# Patient Record
Sex: MALE
[De-identification: names, ages, dates, MRNs, and addresses within clinical notes are randomized per-mention and may not be internally consistent; named-entity substitution may affect disease eponyms.]

---

## 2017-12-01 ENCOUNTER — APPOINTMENT (OUTPATIENT)
Dept: CARDIOLOGY | Facility: CLINIC | Age: 82
End: 2017-12-01

## 2018-11-06 ENCOUNTER — APPOINTMENT (OUTPATIENT)
Dept: CARDIOLOGY | Facility: CLINIC | Age: 83
End: 2018-11-06
Payer: MEDICARE

## 2018-11-06 ENCOUNTER — NON-APPOINTMENT (OUTPATIENT)
Age: 83
End: 2018-11-06

## 2018-11-06 VITALS — WEIGHT: 144 LBS | DIASTOLIC BLOOD PRESSURE: 88 MMHG | SYSTOLIC BLOOD PRESSURE: 180 MMHG | HEART RATE: 63 BPM

## 2018-11-06 DIAGNOSIS — Z85.46 PERSONAL HISTORY OF MALIGNANT NEOPLASM OF PROSTATE: ICD-10-CM

## 2018-11-06 DIAGNOSIS — Z80.3 FAMILY HISTORY OF MALIGNANT NEOPLASM OF BREAST: ICD-10-CM

## 2018-11-06 DIAGNOSIS — R63.4 ABNORMAL WEIGHT LOSS: ICD-10-CM

## 2018-11-06 DIAGNOSIS — R53.83 OTHER FATIGUE: ICD-10-CM

## 2018-11-06 DIAGNOSIS — Z87.891 PERSONAL HISTORY OF NICOTINE DEPENDENCE: ICD-10-CM

## 2018-11-06 DIAGNOSIS — Z80.0 FAMILY HISTORY OF MALIGNANT NEOPLASM OF DIGESTIVE ORGANS: ICD-10-CM

## 2018-11-06 DIAGNOSIS — Z86.79 PERSONAL HISTORY OF OTHER DISEASES OF THE CIRCULATORY SYSTEM: ICD-10-CM

## 2018-11-06 DIAGNOSIS — Z87.39 PERSONAL HISTORY OF OTHER DISEASES OF THE MUSCULOSKELETAL SYSTEM AND CONNECTIVE TISSUE: ICD-10-CM

## 2018-11-06 PROCEDURE — 99204 OFFICE O/P NEW MOD 45 MIN: CPT

## 2018-11-06 PROCEDURE — 93000 ELECTROCARDIOGRAM COMPLETE: CPT

## 2018-11-06 NOTE — REASON FOR VISIT
[Consultation] : a consultation regarding [Aortic Stenosis] : aortic stenosis [Hypertension] : hypertension

## 2018-11-07 NOTE — PHYSICAL EXAM
[General Appearance - Well Developed] : well developed [Normal Appearance] : normal appearance [Well Groomed] : well groomed [General Appearance - Well Nourished] : well nourished [No Deformities] : no deformities [General Appearance - In No Acute Distress] : no acute distress [Normal Conjunctiva] : the conjunctiva exhibited no abnormalities [Eyelids - No Xanthelasma] : the eyelids demonstrated no xanthelasmas [Normal Oral Mucosa] : normal oral mucosa [No Oral Pallor] : no oral pallor [No Oral Cyanosis] : no oral cyanosis [Normal Jugular Venous A Waves Present] : normal jugular venous A waves present [Normal Jugular Venous V Waves Present] : normal jugular venous V waves present [No Jugular Venous Clements A Waves] : no jugular venous clements A waves [Heart Rate And Rhythm] : heart rate and rhythm were normal [Respiration, Rhythm And Depth] : normal respiratory rhythm and effort [Exaggerated Use Of Accessory Muscles For Inspiration] : no accessory muscle use [Auscultation Breath Sounds / Voice Sounds] : lungs were clear to auscultation bilaterally [Abdomen Soft] : soft [Abdomen Tenderness] : non-tender [Abdomen Mass (___ Cm)] : no abdominal mass palpated [Abnormal Walk] : normal gait [Gait - Sufficient For Exercise Testing] : the gait was sufficient for exercise testing [Nail Clubbing] : no clubbing of the fingernails [Cyanosis, Localized] : no localized cyanosis [Petechial Hemorrhages (___cm)] : no petechial hemorrhages [Skin Color & Pigmentation] : normal skin color and pigmentation [] : no rash [No Venous Stasis] : no venous stasis [Skin Lesions] : no skin lesions [No Skin Ulcers] : no skin ulcer [No Xanthoma] : no  xanthoma was observed [Oriented To Time, Place, And Person] : oriented to person, place, and time [Affect] : the affect was normal [Mood] : the mood was normal [No Anxiety] : not feeling anxious [FreeTextEntry1] : [place] [employer] [employer]

## 2018-11-07 NOTE — REVIEW OF SYSTEMS
[Feeling Fatigued] : feeling fatigued [Recent Weight Loss (___ Lbs)] : recent [unfilled] ~Ulb weight loss [see HPI] : see HPI [Memory Lapses Or Loss] : memory lapses or loss [Negative] : Heme/Lymph [Under Stress] : not under stress

## 2018-11-07 NOTE — HISTORY OF PRESENT ILLNESS
[FreeTextEntry1] : MAGDA ZENG  is a 83 year old  M\par History of prostate cancer, cognitive dysfunction\par He previously was told of hypertension and was on blood pressure medications transiently.\par Recently, had an office visit with primary physician for fatigue and weight loss. \par Noted to have murmur and referred for further evaluation. \par He previously was told of heart murmur but has not had any cardiovascular testing.\par \par There is no prior history of a clinical myocardial infarction, coronary revascularization. \par There is no history of symptomatic congestive heart failure rheumatic heart disease.\par There is no history of symptomatic arrhythmias including atrial fibrillation.\par \par Less active due to arthritis. Does home exercises\par There is no exertional chest pain, pressure or discomfort. \par There is no significant dyspnea on exertion or orthopnea. \par There are no symptomatic palpitations, lightheadedness, dizziness or syncope.\par \par EKG demonstrates normal sinus rhythm with right ventricular conduction delay. \par \[place]par \par October 2018. Hemoglobin 11.6, elevated LDH. Hematology evaluation is pending.\par Potassium 5.2, creatinine 0.9, total cholesterol 181, , TSH 1.2. \par \par Primary care referral has been reviewed.

## 2018-11-07 NOTE — ASSESSMENT
[FreeTextEntry1] : Heart murmur. \par Suspect aortic stenosis. \par Echocardiogram. \par Rule out carotid stenosis. \par Further cardiovascular eval after completion of above. \par Requested outside hematology evaluation. \par All questions have been answered. \par Blood work has been reviewed.\par

## 2018-11-14 ENCOUNTER — APPOINTMENT (OUTPATIENT)
Dept: CARDIOLOGY | Facility: CLINIC | Age: 83
End: 2018-11-14
Payer: MEDICARE

## 2018-11-14 PROCEDURE — 93306 TTE W/DOPPLER COMPLETE: CPT

## 2018-11-14 PROCEDURE — 93880 EXTRACRANIAL BILAT STUDY: CPT

## 2018-11-14 PROCEDURE — 93979 VASCULAR STUDY: CPT

## 2018-11-27 ENCOUNTER — APPOINTMENT (OUTPATIENT)
Dept: CARDIOLOGY | Facility: CLINIC | Age: 83
End: 2018-11-27
Payer: MEDICARE

## 2018-11-27 VITALS
DIASTOLIC BLOOD PRESSURE: 62 MMHG | HEIGHT: 65 IN | HEART RATE: 60 BPM | SYSTOLIC BLOOD PRESSURE: 152 MMHG | BODY MASS INDEX: 23.99 KG/M2 | WEIGHT: 144 LBS

## 2018-11-27 PROCEDURE — 99214 OFFICE O/P EST MOD 30 MIN: CPT

## 2018-12-04 ENCOUNTER — MEDICATION RENEWAL (OUTPATIENT)
Age: 83
End: 2018-12-04

## 2018-12-04 RX ORDER — CARVEDILOL 6.25 MG/1
6.25 TABLET, FILM COATED ORAL TWICE DAILY
Qty: 60 | Refills: 2 | Status: ACTIVE | COMMUNITY
Start: 1900-01-01 | End: 1900-01-01

## 2018-12-05 ENCOUNTER — APPOINTMENT (OUTPATIENT)
Dept: CARDIOLOGY | Facility: CLINIC | Age: 83
End: 2018-12-05
Payer: MEDICARE

## 2018-12-05 DIAGNOSIS — Z78.9 OTHER SPECIFIED HEALTH STATUS: ICD-10-CM

## 2018-12-05 DIAGNOSIS — Z83.3 FAMILY HISTORY OF DIABETES MELLITUS: ICD-10-CM

## 2018-12-05 DIAGNOSIS — R55 SYNCOPE AND COLLAPSE: ICD-10-CM

## 2018-12-05 PROCEDURE — 93015 CV STRESS TEST SUPVJ I&R: CPT

## 2018-12-05 PROCEDURE — A9502: CPT

## 2018-12-05 PROCEDURE — 78452 HT MUSCLE IMAGE SPECT MULT: CPT

## 2018-12-05 RX ORDER — ASPIRIN 81 MG
81 TABLET, DELAYED RELEASE (ENTERIC COATED) ORAL DAILY
Refills: 0 | Status: ACTIVE | COMMUNITY

## 2018-12-05 NOTE — ADDENDUM
[FreeTextEntry1] : MAGDA ZENG  is a 83 year M  who presents today Dec 05, 2018 for nuclear stress test. Stress test with no evidence of ischemia or infarct. \par \par Preoperative status for upcoming aneurysm repair \par date December 7, 2018 \par At present, there are no active cardiac conditions. \par No recent unstable coronary syndromes, decompensated heart failure, severe valvular heart disease or significant dysrhythmias.  \par Baseline functional status is limited.    \par The clinical benefit of the proposed procedure outweighs the associated cardiovascular risk.  \par Risk not attenuated with further CV testing.  \par Prior testing as outlined above.\par Optimized from a cardiovascular perspective.\par Minimize time off ASA\par Continue beta blocker\par DVT ppx\par \par Radha Lacey PA-C\par Supervising MD: Teto Mancini

## 2018-12-05 NOTE — PHYSICAL EXAM
[General Appearance - Well Developed] : well developed [Normal Appearance] : normal appearance [Well Groomed] : well groomed [General Appearance - Well Nourished] : well nourished [No Deformities] : no deformities [General Appearance - In No Acute Distress] : no acute distress [Normal Conjunctiva] : the conjunctiva exhibited no abnormalities [Eyelids - No Xanthelasma] : the eyelids demonstrated no xanthelasmas [Normal Oral Mucosa] : normal oral mucosa [No Oral Pallor] : no oral pallor [No Oral Cyanosis] : no oral cyanosis [Normal Jugular Venous A Waves Present] : normal jugular venous A waves present [Normal Jugular Venous V Waves Present] : normal jugular venous V waves present [No Jugular Venous Clements A Waves] : no jugular venous clements A waves [Respiration, Rhythm And Depth] : normal respiratory rhythm and effort [Exaggerated Use Of Accessory Muscles For Inspiration] : no accessory muscle use [Auscultation Breath Sounds / Voice Sounds] : lungs were clear to auscultation bilaterally [Heart Rate And Rhythm] : heart rate and rhythm were normal [Abdomen Soft] : soft [Abdomen Tenderness] : non-tender [Abdomen Mass (___ Cm)] : no abdominal mass palpated [Abnormal Walk] : normal gait [Gait - Sufficient For Exercise Testing] : the gait was sufficient for exercise testing [Nail Clubbing] : no clubbing of the fingernails [Cyanosis, Localized] : no localized cyanosis [Petechial Hemorrhages (___cm)] : no petechial hemorrhages [Skin Color & Pigmentation] : normal skin color and pigmentation [] : no rash [No Venous Stasis] : no venous stasis [Skin Lesions] : no skin lesions [No Skin Ulcers] : no skin ulcer [No Xanthoma] : no  xanthoma was observed [Oriented To Time, Place, And Person] : oriented to person, place, and time [Affect] : the affect was normal [Mood] : the mood was normal [No Anxiety] : not feeling anxious [FreeTextEntry1] : [place]

## 2018-12-05 NOTE — ASSESSMENT
[FreeTextEntry1] : MAGDA ZENG  is a 83 year M  who presents today Nov 27, 2018 in clinical follow-up with the above history and the following active issues. \par \par Abdominal aortic aneurysm.  Scheduled for surgical intervention on December 7.  Preoperative evaluation at the hospital scheduled for tomorrow.  Recommend ischemic evaluation with pharmacological nuclear stress test.  Stress testing can be reviewed over the phone with an addendum to today's note.\par Blood pressure elevated at today's visit.  Recommend increasing carvedilol to 6.25 mg b.i.d. and continuing with Lisinopril. Low sodium diet reviewed. \par Lifestyle and risk factor modification. \par \par Moderate aortic stenosis. Continue to follow. Heart murmur noted on exam. \par Non-obstructive carotid stenosis. \par Recommend fasting lipid panel. Likely patient will benefit from statin therapy.\par Lifestyle and risk factor modification.\par \par Requested outside hematology evaluation. \par All questions have been answered. \par \par Red flag symptoms which would warrant sooner emergent evaluation reviewed with the patient. \par Questions and concerns were addressed and answered. \par \par Sincerely,\par \par Radha Lacey PA-C\par Patients history, testing and plan reviewed with supervising MD: Dr. Teto Mancini

## 2018-12-05 NOTE — REVIEW OF SYSTEMS
[Recent Weight Loss (___ Lbs)] : recent [unfilled] ~Ulb weight loss [see HPI] : see HPI [Memory Lapses Or Loss] : memory lapses or loss [Negative] : Heme/Lymph [Feeling Fatigued] : not feeling fatigued [Under Stress] : not under stress

## 2018-12-05 NOTE — HISTORY OF PRESENT ILLNESS
[FreeTextEntry1] : MAGDA ZENG  is a 83 year old  male presents today in clinical follow-up. Last seen in our office on November 6, 2018 with Dr. Manciin. Echocardiogram, carotid duplex and abdominal aortic ultrasound recommended.  The testing has been performed and he presents today to review the results.  Abdominal ultrasound demonstrated abdominal aortic aneurysm. Patient sent to ER for evaluation. Patient had discussion with Dr. Crowder regarding plan. Started on Carvedilol 3.125 mg b.i.d. and Lisinopril 20 mg q.d. for blood pressure control.  He is now scheduled for preoperative clearance at the hospital tomorrow and surgical intervention for aneurysm repair is scheduled for December 7, 2018 at [facility].\par Today he offers no new complaints.  Tolerating medication well regimen well.\par \par Today he denies chest pain, pressure, unusual shortness of breath, lightheadedness, dizziness, near syncope or syncope. \par \par History of prostate cancer, cognitive dysfunction\par He previously was told of hypertension and was on blood pressure medications transiently.\par \par There is no prior history of a clinical myocardial infarction, coronary revascularization. \par There is no history of symptomatic congestive heart failure rheumatic heart disease.\par There is no history of symptomatic arrhythmias including atrial fibrillation.\par \par Less active due to arthritis. Does home exercises\par There is no exertional chest pain, pressure or discomfort. \par There is no significant dyspnea on exertion or orthopnea. \par There are no symptomatic palpitations, lightheadedness, dizziness or syncope.\par \par EKG demonstrates normal sinus rhythm with right ventricular conduction delay. \par \[place] .\par \par Carotid duplex 11/2018 moderate plaque in the proximal ICA without obstruction\par \par Echo 11/2018 EF 58%, min MR, moderate AS\par \par Abd US 11/2018 possible intimal tear at the mid abd aorta at the infrarenal region extending into the distal abdominal aorta. Distal abdominal aorta aneurysm\par \par \par October 2018. Hemoglobin 11.6, elevated LDH. Hematology evaluation is pending.\par Potassium 5.2, creatinine 0.9, total cholesterol 181, , TSH 1.2. \par \par

## 2019-01-22 ENCOUNTER — APPOINTMENT (OUTPATIENT)
Dept: CARDIOLOGY | Facility: CLINIC | Age: 84
End: 2019-01-22

## 2019-10-29 ENCOUNTER — APPOINTMENT (OUTPATIENT)
Dept: CARDIOLOGY | Facility: CLINIC | Age: 84
End: 2019-10-29
Payer: MEDICARE

## 2019-10-29 VITALS
WEIGHT: 142 LBS | DIASTOLIC BLOOD PRESSURE: 60 MMHG | HEART RATE: 70 BPM | HEIGHT: 65 IN | BODY MASS INDEX: 23.66 KG/M2 | SYSTOLIC BLOOD PRESSURE: 156 MMHG | OXYGEN SATURATION: 97 %

## 2019-10-29 DIAGNOSIS — I65.29 OCCLUSION AND STENOSIS OF UNSPECIFIED CAROTID ARTERY: ICD-10-CM

## 2019-10-29 PROCEDURE — 99214 OFFICE O/P EST MOD 30 MIN: CPT

## 2019-10-29 NOTE — HISTORY OF PRESENT ILLNESS
[FreeTextEntry1] : MAGDA ZENG  is a 83 year old  male presents today in clinical follow-up. . Echocardiogram, carotid duplex and abdominal aortic ultrasound recommended.  The testing has been performed and he presents today to review the results.  Abdominal ultrasound demonstrated abdominal aortic aneurysm. Patient sent to ER for evaluation. Patient had discussion with Dr. Crowder regarding plan. Started on Carvedilol 3.125 mg b.i.d. and Lisinopril 20 mg q.d. for blood pressure control.   SP aneurysm repair.\par Today he offers no new complaints.  Tolerating medication well regimen well.\par \par Today he denies chest pain, pressure, unusual shortness of breath, lightheadedness, dizziness, near syncope or syncope. \par \par History of prostate cancer, cognitive dysfunction\par He previously was told of hypertension and was on blood pressure medications transiently.\par \par There is no prior history of a clinical myocardial infarction, coronary revascularization. \par There is no history of symptomatic congestive heart failure rheumatic heart disease.\par There is no history of symptomatic arrhythmias including atrial fibrillation.\par \par Less active due to arthritis. Does home exercises\par There is no exertional chest pain, pressure or discomfort. \par There is no significant dyspnea on exertion or orthopnea. \par There are no symptomatic palpitations, lightheadedness, dizziness or syncope.\par \par EKG demonstrates normal sinus rhythm with right ventricular conduction delay. \par \[place] .\par \par Carotid duplex 11/2018 moderate plaque in the proximal ICA without obstruction\par \par Echo 11/2018 EF 58%, min MR, moderate AS\par \par Abd US 11/2018 possible intimal tear at the mid abd aorta at the infrarenal region extending into the distal abdominal aorta. Distal abdominal aorta aneurysm\par \par \par October 2018. Hemoglobin 11.6, elevated LDH. Hematology evaluation is pending.\par Potassium 5.2, creatinine 0.9, total cholesterol 181, , TSH 1.2. \par \par

## 2019-10-29 NOTE — ASSESSMENT
[FreeTextEntry1] : MAGDA ZENG  is a 83 year M  who presents today  in clinical follow-up with the above history and the following active issues. \par \par Abdominal aortic aneurysm.  SP repair\par Recommend increasing carvedilol to 6.25 mg b.i.d. and continuing with Lisinopril. Low sodium diet reviewed. \par Lifestyle and risk factor modification. \par \par Moderate aortic stenosis. Continue to follow. Heart murmur noted on exam. \par Non-obstructive carotid stenosis. \par Recommend fasting lipid panel. Likely patient will benefit from statin therapy.\par Lifestyle and risk factor modification.\par Patient was found to have uncontrolled hypertension by his PMD. Patient admits that he stopped taking  Coreg over a year ago. It was restarted yesterday. Today his blood pressure is 156/60. Continue present medications. Continue blood pressure monitoring. Compliance with medications discussed with patient. Echocardiogram will be scheduled to reevaluate his valvular heart disease. Follow up ather of the echocardiogram.\par \par \par Requested outside hematology evaluation. \par All questions have been answered. \par \par Red flag symptoms which would warrant sooner emergent evaluation reviewed with the patient. \par Questions and concerns were addressed and answered. \par \par

## 2019-10-31 ENCOUNTER — APPOINTMENT (OUTPATIENT)
Dept: CARDIOLOGY | Facility: CLINIC | Age: 84
End: 2019-10-31
Payer: MEDICARE

## 2019-10-31 ENCOUNTER — TRANSCRIPTION ENCOUNTER (OUTPATIENT)
Age: 84
End: 2019-10-31

## 2019-10-31 PROCEDURE — 93306 TTE W/DOPPLER COMPLETE: CPT

## 2019-11-04 ENCOUNTER — NON-APPOINTMENT (OUTPATIENT)
Age: 84
End: 2019-11-04

## 2019-11-04 ENCOUNTER — APPOINTMENT (OUTPATIENT)
Dept: CARDIOLOGY | Facility: CLINIC | Age: 84
End: 2019-11-04
Payer: MEDICARE

## 2019-11-04 VITALS
OXYGEN SATURATION: 95 % | DIASTOLIC BLOOD PRESSURE: 70 MMHG | HEART RATE: 61 BPM | WEIGHT: 144 LBS | HEIGHT: 65 IN | SYSTOLIC BLOOD PRESSURE: 148 MMHG | BODY MASS INDEX: 23.99 KG/M2

## 2019-11-04 DIAGNOSIS — Z00.00 ENCOUNTER FOR GENERAL ADULT MEDICAL EXAMINATION W/OUT ABNORMAL FINDINGS: ICD-10-CM

## 2019-11-04 PROCEDURE — 93000 ELECTROCARDIOGRAM COMPLETE: CPT

## 2019-11-04 PROCEDURE — 99214 OFFICE O/P EST MOD 30 MIN: CPT

## 2019-11-04 RX ORDER — UBIDECARENONE/VIT E ACET 100MG-5
CAPSULE ORAL
Refills: 0 | Status: ACTIVE | COMMUNITY

## 2019-11-04 RX ORDER — VITAMIN B COMPLEX
CAPSULE ORAL
Refills: 0 | Status: ACTIVE | COMMUNITY

## 2019-11-04 RX ORDER — MV-MIN/FOLIC/VIT K/LYCOP/COQ10 200-100MCG
CAPSULE ORAL DAILY
Refills: 0 | Status: ACTIVE | COMMUNITY

## 2019-11-04 RX ORDER — OMEGA-3/DHA/EPA/FISH OIL 300-1000MG
CAPSULE ORAL
Refills: 0 | Status: ACTIVE | COMMUNITY

## 2019-11-04 RX ORDER — BACILLUS COAGULANS/INULIN 1B-250 MG
CAPSULE ORAL
Refills: 0 | Status: ACTIVE | COMMUNITY

## 2019-11-07 PROBLEM — Z00.00 ENCOUNTER FOR PREVENTIVE HEALTH EXAMINATION: Status: ACTIVE | Noted: 2017-11-08

## 2019-11-07 NOTE — ASSESSMENT
[FreeTextEntry1] : AAA repair\par VHD \par HL\par Anemia\par Cognitive dysfunction\par \par Reviewed diagnosis of aortic stenosis\par Followup limited echocardiogram \par If progressive aortic stenosis, will refer for TAVR but need to sort out anemia and cognitive status\par \par All questions have been answered. \par

## 2019-11-07 NOTE — HISTORY OF PRESENT ILLNESS
[FreeTextEntry1] : MAGDA ZENG  is a 83 year old  M\par \par \par History of prostate cancer, cognitive dysfunction, PAD, VHD, HL\par There is no prior history of a clinical myocardial infarction, coronary revascularization. \par There is no history of symptomatic congestive heart failure rheumatic heart disease.\par There is no history of atrial fibrillation.\par \par s/p endovascular repair of saccular infrarenal aneurysm\par \par Scheduled to see neurology\par and hematology regarding anemia \par There preston been clinical improvement in his blood pressure \par \par Does home exercises\par There is no exertional chest pain, pressure or discomfort. \par There is no significant dyspnea on exertion or orthopnea. \par There are no symptomatic palpitations, lightheadedness, dizziness or syncope.\par \par Carotid Doppler mild to moderate plaque \par Last blood work with total cholesterol 179  nuclear stress test December 2018. No ischemia. Last 4, hemoglobin 8.9 total cholesterol 191, , TSH 1.3. Nuclear stress test December 2018. No ischemia. \par Echocardiogram October 2019 demonstrates normal left ventricular function with peak velocity of 3.8 m/s mean gradient of 30, estimated valve area 0.9\par EKG demonstrates sinus bradycardia with early repolarization\par  \par [place] .

## 2019-11-07 NOTE — PHYSICAL EXAM
[General Appearance - Well Developed] : well developed [Normal Appearance] : normal appearance [Well Groomed] : well groomed [General Appearance - Well Nourished] : well nourished [No Deformities] : no deformities [General Appearance - In No Acute Distress] : no acute distress [Normal Conjunctiva] : the conjunctiva exhibited no abnormalities [Eyelids - No Xanthelasma] : the eyelids demonstrated no xanthelasmas [Normal Oral Mucosa] : normal oral mucosa [No Oral Pallor] : no oral pallor [No Oral Cyanosis] : no oral cyanosis [Normal Jugular Venous A Waves Present] : normal jugular venous A waves present [Normal Jugular Venous V Waves Present] : normal jugular venous V waves present [No Jugular Venous Clements A Waves] : no jugular venous clements A waves [Respiration, Rhythm And Depth] : normal respiratory rhythm and effort [Exaggerated Use Of Accessory Muscles For Inspiration] : no accessory muscle use [Auscultation Breath Sounds / Voice Sounds] : lungs were clear to auscultation bilaterally [Heart Rate And Rhythm] : heart rate and rhythm were normal [Abdomen Soft] : soft [Abdomen Tenderness] : non-tender [Abdomen Mass (___ Cm)] : no abdominal mass palpated [Abnormal Walk] : normal gait [Gait - Sufficient For Exercise Testing] : the gait was sufficient for exercise testing [Nail Clubbing] : no clubbing of the fingernails [Cyanosis, Localized] : no localized cyanosis [Petechial Hemorrhages (___cm)] : no petechial hemorrhages [Skin Color & Pigmentation] : normal skin color and pigmentation [] : no rash [No Venous Stasis] : no venous stasis [Skin Lesions] : no skin lesions [No Skin Ulcers] : no skin ulcer [No Xanthoma] : no  xanthoma was observed [Oriented To Time, Place, And Person] : oriented to person, place, and time [Affect] : the affect was normal [Mood] : the mood was normal [No Anxiety] : not feeling anxious [FreeTextEntry1] : + bruit v radiation decr upstroke

## 2020-02-04 ENCOUNTER — APPOINTMENT (OUTPATIENT)
Dept: CARDIOLOGY | Facility: CLINIC | Age: 85
End: 2020-02-04
Payer: MEDICARE

## 2020-02-04 PROCEDURE — 93308 TTE F-UP OR LMTD: CPT

## 2020-02-05 ENCOUNTER — APPOINTMENT (OUTPATIENT)
Dept: CARDIOLOGY | Facility: CLINIC | Age: 85
End: 2020-02-05
Payer: MEDICARE

## 2020-02-05 VITALS
OXYGEN SATURATION: 97 % | HEART RATE: 76 BPM | HEIGHT: 65 IN | WEIGHT: 142 LBS | BODY MASS INDEX: 23.66 KG/M2 | DIASTOLIC BLOOD PRESSURE: 60 MMHG | SYSTOLIC BLOOD PRESSURE: 120 MMHG

## 2020-02-05 DIAGNOSIS — Z86.79 PERSONAL HISTORY OF OTHER DISEASES OF THE CIRCULATORY SYSTEM: ICD-10-CM

## 2020-02-05 DIAGNOSIS — I10 ESSENTIAL (PRIMARY) HYPERTENSION: ICD-10-CM

## 2020-02-05 DIAGNOSIS — I25.84 ATHEROSCLEROTIC HEART DISEASE OF NATIVE CORONARY ARTERY W/OUT ANGINA PECTORIS: ICD-10-CM

## 2020-02-05 DIAGNOSIS — I35.0 NONRHEUMATIC AORTIC (VALVE) STENOSIS: ICD-10-CM

## 2020-02-05 DIAGNOSIS — Z01.810 ENCOUNTER FOR PREPROCEDURAL CARDIOVASCULAR EXAMINATION: ICD-10-CM

## 2020-02-05 DIAGNOSIS — I25.10 ATHEROSCLEROTIC HEART DISEASE OF NATIVE CORONARY ARTERY W/OUT ANGINA PECTORIS: ICD-10-CM

## 2020-02-05 DIAGNOSIS — I71.02 DISSECTION OF ABDOMINAL AORTA: ICD-10-CM

## 2020-02-05 DIAGNOSIS — R03.0 ELEVATED BLOOD-PRESSURE READING, W/OUT DIAGNOSIS OF HYPERTENSION: ICD-10-CM

## 2020-02-05 PROCEDURE — 99215 OFFICE O/P EST HI 40 MIN: CPT

## 2020-02-05 NOTE — REASON FOR VISIT
[Aortic Stenosis] : aortic stenosis [Hypertension] : hypertension [Consultation] : a consultation regarding

## 2020-02-08 PROBLEM — R03.0 ELEVATED BLOOD PRESSURE READING: Status: RESOLVED | Noted: 2018-11-06 | Resolved: 2020-02-08

## 2020-02-08 PROBLEM — I25.10 CORONARY ARTERY CALCIFICATION: Status: ACTIVE | Noted: 2020-02-08

## 2020-02-08 PROBLEM — I71.02 ABDOMINAL AORTIC ANEURYSM DISSECTION: Status: ACTIVE | Noted: 2018-11-27

## 2020-02-08 PROBLEM — I35.0 NON-RHEUMATIC AORTIC STENOSIS: Status: ACTIVE | Noted: 2018-11-06

## 2020-02-08 PROBLEM — Z86.79 HISTORY OF CARDIAC MURMUR: Status: RESOLVED | Noted: 2018-11-06 | Resolved: 2020-02-08

## 2020-02-08 PROBLEM — Z01.810 PREOPERATIVE CARDIOVASCULAR EXAMINATION: Status: RESOLVED | Noted: 2018-11-27 | Resolved: 2020-02-08

## 2020-02-08 NOTE — PHYSICAL EXAM
[Normal Appearance] : normal appearance [Well Groomed] : well groomed [General Appearance - Well Nourished] : well nourished [General Appearance - Well Developed] : well developed [No Deformities] : no deformities [General Appearance - In No Acute Distress] : no acute distress [Normal Conjunctiva] : the conjunctiva exhibited no abnormalities [Eyelids - No Xanthelasma] : the eyelids demonstrated no xanthelasmas [Normal Oral Mucosa] : normal oral mucosa [No Oral Pallor] : no oral pallor [No Oral Cyanosis] : no oral cyanosis [Normal Jugular Venous A Waves Present] : normal jugular venous A waves present [No Jugular Venous Clements A Waves] : no jugular venous clements A waves [Normal Jugular Venous V Waves Present] : normal jugular venous V waves present [Auscultation Breath Sounds / Voice Sounds] : lungs were clear to auscultation bilaterally [Respiration, Rhythm And Depth] : normal respiratory rhythm and effort [Exaggerated Use Of Accessory Muscles For Inspiration] : no accessory muscle use [Heart Rate And Rhythm] : heart rate and rhythm were normal [Abdomen Tenderness] : non-tender [Abdomen Soft] : soft [Abdomen Mass (___ Cm)] : no abdominal mass palpated [Gait - Sufficient For Exercise Testing] : the gait was sufficient for exercise testing [Abnormal Walk] : normal gait [Cyanosis, Localized] : no localized cyanosis [Petechial Hemorrhages (___cm)] : no petechial hemorrhages [Nail Clubbing] : no clubbing of the fingernails [Skin Color & Pigmentation] : normal skin color and pigmentation [] : no rash [No Venous Stasis] : no venous stasis [Skin Lesions] : no skin lesions [No Skin Ulcers] : no skin ulcer [Oriented To Time, Place, And Person] : oriented to person, place, and time [No Xanthoma] : no  xanthoma was observed [Mood] : the mood was normal [Affect] : the affect was normal [No Anxiety] : not feeling anxious [FreeTextEntry1] : [place] [employer] [employer]

## 2020-02-08 NOTE — REVIEW OF SYSTEMS
[Recent Weight Loss (___ Lbs)] : recent [unfilled] ~Ulb weight loss [Feeling Fatigued] : feeling fatigued [see HPI] : see HPI [Memory Lapses Or Loss] : memory lapses or loss [Negative] : Heme/Lymph [Under Stress] : not under stress

## 2020-02-08 NOTE — HISTORY OF PRESENT ILLNESS
[FreeTextEntry1] : MAGDA ZENG  is a 85 year old  M\par \par History of prostate cancer, cognitive dysfunction, PAD, VHD, HL\par There is no prior history of a clinical myocardial infarction, coronary revascularization. \par There is no history of symptomatic congestive heart failure rheumatic heart disease.\par There is no history of atrial fibrillation.\par \par s/p endovascular repair of saccular infrarenal aneurysm\par Recently saw vascular and told aneurysm, stable. \par Following with [facility] for anemia.  Last hemoglobin 10.8.\par Followup with Dr. Chun for cognitive dysfunction.\par \par Does home exercises\par There is no exertional chest pain, pressure or discomfort. \par There is no significant dyspnea on exertion or orthopnea. \par There are no symptomatic palpitations, lightheadedness, dizziness or syncope.\par \par Outside chest CT for lymphadenopathy is notable for multivessel coronary artery calcification. \par \par Echocardiogram, February 2020 with normal left ventricular function. Peak velocity 4 m/s. aortic valve area less than one dimensionless index 0.2. \par Carotid Doppler 11/18 mild to moderate plaque \par Nuclear stress test December 2018. No ischemia.\par Echocardiogram October 2019 demonstrates normal left ventricular function with peak velocity of 3.8 m/s mean gradient of 30, estimated valve area 0.9\par  \[place] .\par

## 2020-02-08 NOTE — ASSESSMENT
[FreeTextEntry1] : \par AAA repair\par VHD, AS\par HL\par Anemia\par Cognitive dysfunction\par CAD, CAC on CT\par \par There is progressive, severe aortic stenosis. \par Recommend TAVR evaluation.\par Discussed the diagnosis natural history and pathophysiology.  Discussed indication for treatment.  Office consultation will be scheduled with Dr. Magallanes.  Discussed preprocedural planning including CT scan, cardiac catheterization and multidisciplinary team management\par All questions have been answered. \par \par Continue aspirin therapy and blood pressure management.  May require meds adjustment of antihypertensives post valve procedure

## 2020-02-21 ENCOUNTER — APPOINTMENT (OUTPATIENT)
Dept: CARDIOLOGY | Facility: CLINIC | Age: 85
End: 2020-02-21
Payer: MEDICARE

## 2020-02-21 VITALS
HEART RATE: 62 BPM | OXYGEN SATURATION: 95 % | SYSTOLIC BLOOD PRESSURE: 162 MMHG | HEIGHT: 65 IN | DIASTOLIC BLOOD PRESSURE: 84 MMHG | WEIGHT: 145 LBS | BODY MASS INDEX: 24.16 KG/M2

## 2020-02-21 PROCEDURE — 99215 OFFICE O/P EST HI 40 MIN: CPT

## 2020-02-25 ENCOUNTER — APPOINTMENT (OUTPATIENT)
Dept: CARDIOLOGY | Facility: CLINIC | Age: 85
End: 2020-02-25
Payer: MEDICARE

## 2020-02-25 DIAGNOSIS — I48.91 UNSPECIFIED ATRIAL FIBRILLATION: ICD-10-CM

## 2020-02-25 PROCEDURE — 93015 CV STRESS TEST SUPVJ I&R: CPT

## 2020-02-25 PROCEDURE — 0296T: CPT

## 2020-02-28 ENCOUNTER — APPOINTMENT (OUTPATIENT)
Dept: CARDIOLOGY | Facility: CLINIC | Age: 85
End: 2020-02-28
Payer: MEDICARE

## 2020-02-28 VITALS
DIASTOLIC BLOOD PRESSURE: 64 MMHG | HEIGHT: 65 IN | HEART RATE: 67 BPM | BODY MASS INDEX: 24.32 KG/M2 | SYSTOLIC BLOOD PRESSURE: 158 MMHG | OXYGEN SATURATION: 97 % | WEIGHT: 146 LBS

## 2020-02-28 PROCEDURE — 99214 OFFICE O/P EST MOD 30 MIN: CPT

## 2020-03-03 RX ORDER — MEMANTINE HYDROCHLORIDE 5 MG-10 MG
28 X 5 MG & KIT ORAL
Qty: 49 | Refills: 0 | Status: DISCONTINUED | COMMUNITY
Start: 2020-02-11 | End: 2020-03-03

## 2020-03-03 RX ORDER — APIXABAN 5 MG/1
5 TABLET, FILM COATED ORAL
Qty: 180 | Refills: 0 | Status: DISCONTINUED | COMMUNITY
Start: 2020-02-25 | End: 2020-03-03

## 2020-03-06 ENCOUNTER — APPOINTMENT (OUTPATIENT)
Dept: CARDIOLOGY | Facility: CLINIC | Age: 85
End: 2020-03-06
Payer: MEDICARE

## 2020-03-06 PROCEDURE — 93880 EXTRACRANIAL BILAT STUDY: CPT

## 2020-03-12 ENCOUNTER — OUTPATIENT (OUTPATIENT)
Dept: OUTPATIENT SERVICES | Facility: HOSPITAL | Age: 85
LOS: 1 days | End: 2020-03-12

## 2020-03-13 PROCEDURE — 0298T: CPT

## 2020-03-16 ENCOUNTER — APPOINTMENT (OUTPATIENT)
Dept: CARDIOLOGY | Facility: CLINIC | Age: 85
End: 2020-03-16

## 2020-03-23 ENCOUNTER — APPOINTMENT (OUTPATIENT)
Dept: CARDIOTHORACIC SURGERY | Facility: CLINIC | Age: 85
End: 2020-03-23

## 2020-03-27 RX ORDER — APIXABAN 5 MG/1
5 TABLET, FILM COATED ORAL
Qty: 60 | Refills: 0 | Status: ACTIVE | COMMUNITY
Start: 2020-03-27 | End: 1900-01-01

## 2020-04-08 RX ORDER — LISINOPRIL 20 MG/1
20 TABLET ORAL DAILY
Qty: 90 | Refills: 3 | Status: ACTIVE | COMMUNITY
Start: 1900-01-01 | End: 1900-01-01

## 2021-10-06 PROBLEM — I10 ESSENTIAL HYPERTENSION: Status: ACTIVE | Noted: 2020-02-08
